# Patient Record
Sex: FEMALE | Race: WHITE | Employment: OTHER | URBAN - METROPOLITAN AREA
[De-identification: names, ages, dates, MRNs, and addresses within clinical notes are randomized per-mention and may not be internally consistent; named-entity substitution may affect disease eponyms.]

---

## 2019-02-03 ENCOUNTER — APPOINTMENT (OUTPATIENT)
Dept: GENERAL RADIOLOGY | Age: 74
End: 2019-02-03
Payer: MEDICARE

## 2019-02-03 ENCOUNTER — HOSPITAL ENCOUNTER (EMERGENCY)
Age: 74
Discharge: HOME | End: 2019-02-03
Attending: EMERGENCY MEDICINE
Payer: MEDICARE

## 2019-02-03 VITALS
RESPIRATION RATE: 10 BRPM | OXYGEN SATURATION: 94 % | SYSTOLIC BLOOD PRESSURE: 117 MMHG | HEART RATE: 62 BPM | DIASTOLIC BLOOD PRESSURE: 49 MMHG

## 2019-02-03 DIAGNOSIS — R07.9 CHEST PAIN, UNSPECIFIED TYPE: Primary | ICD-10-CM

## 2019-02-03 LAB
ABSOLUTE EOS #: 0.7 K/UL (ref 0–0.4)
ABSOLUTE IMMATURE GRANULOCYTE: ABNORMAL K/UL (ref 0–0.3)
ABSOLUTE LYMPH #: 2.4 K/UL (ref 1–4.8)
ABSOLUTE MONO #: 0.9 K/UL (ref 0.2–0.8)
ANION GAP SERPL CALCULATED.3IONS-SCNC: 16 MMOL/L (ref 9–17)
BASOPHILS # BLD: 0 % (ref 0–2)
BASOPHILS ABSOLUTE: 0 K/UL (ref 0–0.2)
BNP INTERPRETATION: ABNORMAL
BUN BLDV-MCNC: 17 MG/DL (ref 8–23)
BUN/CREAT BLD: 27 (ref 9–20)
CALCIUM SERPL-MCNC: 9.7 MG/DL (ref 8.6–10.4)
CHLORIDE BLD-SCNC: 100 MMOL/L (ref 98–107)
CO2: 24 MMOL/L (ref 20–31)
CREAT SERPL-MCNC: 0.62 MG/DL (ref 0.5–0.9)
DIFFERENTIAL TYPE: ABNORMAL
EKG ATRIAL RATE: 70 BPM
EKG P AXIS: 63 DEGREES
EKG P-R INTERVAL: 154 MS
EKG Q-T INTERVAL: 388 MS
EKG QRS DURATION: 88 MS
EKG QTC CALCULATION (BAZETT): 419 MS
EKG R AXIS: 94 DEGREES
EKG T AXIS: 89 DEGREES
EKG VENTRICULAR RATE: 70 BPM
EOSINOPHILS RELATIVE PERCENT: 7 % (ref 1–4)
GFR AFRICAN AMERICAN: >60 ML/MIN
GFR NON-AFRICAN AMERICAN: >60 ML/MIN
GFR SERPL CREATININE-BSD FRML MDRD: ABNORMAL ML/MIN/{1.73_M2}
GFR SERPL CREATININE-BSD FRML MDRD: ABNORMAL ML/MIN/{1.73_M2}
GLUCOSE BLD-MCNC: 119 MG/DL (ref 70–99)
HCT VFR BLD CALC: 39.4 % (ref 36–46)
HEMOGLOBIN: 13.2 G/DL (ref 12–16)
IMMATURE GRANULOCYTES: ABNORMAL %
LYMPHOCYTES # BLD: 23 % (ref 24–44)
MCH RBC QN AUTO: 28.3 PG (ref 26–34)
MCHC RBC AUTO-ENTMCNC: 33.4 G/DL (ref 31–37)
MCV RBC AUTO: 84.8 FL (ref 80–100)
MONOCYTES # BLD: 8 % (ref 1–7)
MYOGLOBIN: 23 NG/ML (ref 25–58)
NRBC AUTOMATED: ABNORMAL PER 100 WBC
PDW BLD-RTO: 14.1 % (ref 11.5–14.5)
PLATELET # BLD: 267 K/UL (ref 130–400)
PLATELET ESTIMATE: ABNORMAL
PMV BLD AUTO: 10 FL (ref 6–12)
POTASSIUM SERPL-SCNC: 4.4 MMOL/L (ref 3.7–5.3)
PRO-BNP: 347 PG/ML
RBC # BLD: 4.65 M/UL (ref 4–5.2)
RBC # BLD: ABNORMAL 10*6/UL
SEG NEUTROPHILS: 62 % (ref 36–66)
SEGMENTED NEUTROPHILS ABSOLUTE COUNT: 6.6 K/UL (ref 1.8–7.7)
SODIUM BLD-SCNC: 140 MMOL/L (ref 135–144)
TROPONIN INTERP: ABNORMAL
TROPONIN T: ABNORMAL NG/ML
TROPONIN, HIGH SENSITIVITY: 7 NG/L (ref 0–14)
WBC # BLD: 10.6 K/UL (ref 3.5–11)
WBC # BLD: ABNORMAL 10*3/UL

## 2019-02-03 PROCEDURE — 71045 X-RAY EXAM CHEST 1 VIEW: CPT

## 2019-02-03 PROCEDURE — 80048 BASIC METABOLIC PNL TOTAL CA: CPT

## 2019-02-03 PROCEDURE — 83880 ASSAY OF NATRIURETIC PEPTIDE: CPT

## 2019-02-03 PROCEDURE — 85025 COMPLETE CBC W/AUTO DIFF WBC: CPT

## 2019-02-03 PROCEDURE — 83874 ASSAY OF MYOGLOBIN: CPT

## 2019-02-03 PROCEDURE — 84484 ASSAY OF TROPONIN QUANT: CPT

## 2019-02-03 PROCEDURE — 99285 EMERGENCY DEPT VISIT HI MDM: CPT

## 2019-02-03 PROCEDURE — 93005 ELECTROCARDIOGRAM TRACING: CPT

## 2019-02-04 ASSESSMENT — ENCOUNTER SYMPTOMS
WHEEZING: 0
RHINORRHEA: 0
VOMITING: 0
SORE THROAT: 0
SHORTNESS OF BREATH: 0
COUGH: 0
CONSTIPATION: 0
ABDOMINAL PAIN: 0
SINUS PRESSURE: 0
COLOR CHANGE: 0
DIARRHEA: 0
NAUSEA: 0

## 2019-08-15 ENCOUNTER — HOSPITAL ENCOUNTER (OUTPATIENT)
Dept: CARDIAC CATH/INVASIVE PROCEDURES | Age: 74
Discharge: HOME | End: 2019-08-15
Payer: MEDICARE

## 2019-08-15 VITALS
WEIGHT: 195 LBS | OXYGEN SATURATION: 96 % | RESPIRATION RATE: 16 BRPM | HEIGHT: 64 IN | SYSTOLIC BLOOD PRESSURE: 100 MMHG | DIASTOLIC BLOOD PRESSURE: 46 MMHG | BODY MASS INDEX: 33.29 KG/M2 | HEART RATE: 45 BPM | TEMPERATURE: 97.3 F

## 2019-08-15 LAB
GFR NON-AFRICAN AMERICAN: >60 ML/MIN
GFR SERPL CREATININE-BSD FRML MDRD: >60 ML/MIN
GFR SERPL CREATININE-BSD FRML MDRD: NORMAL ML/MIN/{1.73_M2}
GLUCOSE BLD-MCNC: 162 MG/DL (ref 74–100)
PLATELET # BLD: 255 K/UL (ref 138–453)
POC CHLORIDE: 104 MMOL/L (ref 98–107)
POC CREATININE: 0.58 MG/DL (ref 0.51–1.19)
POC HEMATOCRIT: 43 % (ref 36–46)
POC HEMOGLOBIN: 14.5 G/DL (ref 12–16)
POC POTASSIUM: 4.4 MMOL/L (ref 3.5–4.5)
POC SODIUM: 139 MMOL/L (ref 138–146)

## 2019-08-15 PROCEDURE — 82565 ASSAY OF CREATININE: CPT

## 2019-08-15 PROCEDURE — 6360000004 HC RX CONTRAST MEDICATION

## 2019-08-15 PROCEDURE — 7100000010 HC PHASE II RECOVERY - FIRST 15 MIN

## 2019-08-15 PROCEDURE — 6360000002 HC RX W HCPCS

## 2019-08-15 PROCEDURE — C1725 CATH, TRANSLUMIN NON-LASER: HCPCS

## 2019-08-15 PROCEDURE — 2500000003 HC RX 250 WO HCPCS

## 2019-08-15 PROCEDURE — C1894 INTRO/SHEATH, NON-LASER: HCPCS

## 2019-08-15 PROCEDURE — 85049 AUTOMATED PLATELET COUNT: CPT

## 2019-08-15 PROCEDURE — 84295 ASSAY OF SERUM SODIUM: CPT

## 2019-08-15 PROCEDURE — 82947 ASSAY GLUCOSE BLOOD QUANT: CPT

## 2019-08-15 PROCEDURE — 84132 ASSAY OF SERUM POTASSIUM: CPT

## 2019-08-15 PROCEDURE — 85014 HEMATOCRIT: CPT

## 2019-08-15 PROCEDURE — 2709999900 HC NON-CHARGEABLE SUPPLY

## 2019-08-15 PROCEDURE — C1769 GUIDE WIRE: HCPCS

## 2019-08-15 PROCEDURE — 82435 ASSAY OF BLOOD CHLORIDE: CPT

## 2019-08-15 PROCEDURE — 7100000011 HC PHASE II RECOVERY - ADDTL 15 MIN

## 2019-08-15 PROCEDURE — 93458 L HRT ARTERY/VENTRICLE ANGIO: CPT | Performed by: INTERNAL MEDICINE

## 2019-08-15 RX ORDER — SODIUM CHLORIDE 0.9 % (FLUSH) 0.9 %
10 SYRINGE (ML) INJECTION PRN
Status: CANCELLED | OUTPATIENT
Start: 2019-08-15

## 2019-08-15 RX ORDER — SODIUM CHLORIDE 0.9 % (FLUSH) 0.9 %
10 SYRINGE (ML) INJECTION EVERY 12 HOURS SCHEDULED
Status: CANCELLED | OUTPATIENT
Start: 2019-08-15

## 2019-08-15 RX ORDER — SODIUM CHLORIDE 0.9 % (FLUSH) 0.9 %
10 SYRINGE (ML) INJECTION PRN
Status: DISCONTINUED | OUTPATIENT
Start: 2019-08-15 | End: 2019-08-16 | Stop reason: HOSPADM

## 2019-08-15 RX ORDER — SODIUM CHLORIDE 9 MG/ML
INJECTION, SOLUTION INTRAVENOUS CONTINUOUS
Status: DISCONTINUED | OUTPATIENT
Start: 2019-08-15 | End: 2019-08-16 | Stop reason: HOSPADM

## 2019-08-15 RX ORDER — ACETAMINOPHEN 325 MG/1
650 TABLET ORAL EVERY 4 HOURS PRN
Status: CANCELLED | OUTPATIENT
Start: 2019-08-15

## 2019-08-15 RX ORDER — SODIUM CHLORIDE 0.9 % (FLUSH) 0.9 %
10 SYRINGE (ML) INJECTION EVERY 12 HOURS SCHEDULED
Status: DISCONTINUED | OUTPATIENT
Start: 2019-08-15 | End: 2019-08-16 | Stop reason: HOSPADM

## 2019-08-15 RX ADMIN — SODIUM CHLORIDE: 9 INJECTION, SOLUTION INTRAVENOUS at 10:02

## 2019-08-15 NOTE — OP NOTE
[facility]    CARDIAC CATHETERIZATION    Date:   8/15/2019  Patient name: Rod Bell  Date of admission:  8/15/2019  9:32 AM  MRN:   [id number]  YOB: 1945    Operators:  Primary:  Dr Michelle Boone   CV Fellow:  Dr Silviano Page    Pre Procedure Conscious Sedation Data:    ASA Class:    [] I [x] II [] III [] IV    Mallampati Class:  [] I [x] II [] III [] IV     Indication:  [] STEMI      [x] + Stress test  [] ACS      [] + EKG Changes  [] Non Q MI       [x] Significant Risk Factors  [] Recurrent Angina             [] Diabetes Mellitus    [] New LBBB      [] Uncontrolled HTN. [] CHF / Low EF changes     [] Abnormal CTA / Ca Score    Procedure:  Access:  [] Femoral  [x] Radial  artery       [x] Right  [] Left    Procedure: After informed consent was obtained with explanation of the risks and benefits, patient was brought to the cath lab. The access area was prepped and draped in sterile fashion. 1% lidocaine was used for local block. The artery was cannulated with 6  Fr sheath with brisk arterial blood return. The side port was frequently flushed and aspirated with normal saline. Findings:  Left main: Normal 0% stenosis  LAD: patent stent with minimum luminal ir regularities 20-30% stenosis small mid and distal vessel  LCX: minimum luminal ir regularities 10-20% stenosis  RCA: Normal 0% stenosis  The LV gram was performed in the DENNIS 30 position. LVEF: 50%. LV Wall Motion: Anterior apical hypokinesis    Conclusions:  1. Minimal CAD  2. Patent LAD stent  3. Borderline LV function    Recommendation:  1. Medical treatments        History and Risk Factors    [x] Hypertension     [] Family history of CAD  [x] Hyperlipidemia     [] Cerebrovascular Disease   [x] Prior MI       [] Peripheral Vascular disease   [x] Prior PCI              [] Diabetes Mellitus    [] Left Main PCI. [] Currently on Dialysis. [] Prior CABG. [] Currently smoker.     [] Cardiac Arrest outside of healthcare

## 2019-08-15 NOTE — H&P
mg by mouth daily. Historical Provider, MD   clopidogrel (PLAVIX) 75 MG tablet Take 75 mg by mouth daily. Historical Provider, MD   metoprolol (LOPRESSOR) 50 MG tablet Take 50 mg by mouth 2 times daily. Historical Provider, MD   atorvastatin (LIPITOR) 40 MG tablet Take 40 mg by mouth daily. Historical Provider, MD   furosemide (LASIX) 20 MG tablet Take 20 mg by mouth 2 times daily. Historical Provider, MD   docusate sodium (COLACE) 100 MG capsule Take 100 mg by mouth 2 times daily. Historical Provider, MD       Allergies   Allergen Reactions    Cleocin [Clindamycin Hcl] Rash         REVIEW OF SYSTEMS:     A detailed review of system was performed as already noted and is otherwise as above. PHYSICAL EXAM:     There were no vitals filed for this visit. Constitutional and General Appearance: alert, cooperative, no distress and appears stated age  [de-identified]: PERRL, no cervical lymphadenopathy. No masses palpable. Normal oral mucosa  Respiratory:  · Normal excursion and expansion without use of accessory muscles  · Resp Auscultation: Good respiratory effort. No for increased work of breathing. On auscultation: clear to auscultation bilaterally  Cardiovascular:  · The apical impulse is not displaced  · Heart tones are crisp and normal. regular S1 and S2. Abdomen:  · No masses or tenderness  · Bowel sounds present  Extremities:  ·  No Cyanosis or Clubbing  ·  Lower extremity edema: Yes  · Skin: Warm and dry  Neurological:  · Alert and oriented. · Moves all extremities well  · No abnormalities of mood, affect, memory, mentation, or behavior are noted      Assessment:  1. Stable angina with recurrent anginal symptoms with stress test showing anterior apical infarction with ischemia  2. HTN  3. Dyslipidemia        Plan:  1. Proceed with planned LHC after discussing with Dr COTO  2. Further orders to follow.        Joselyn Vargas MD  Fellow, Cardiovascular Diseases    [address] [facility]        Attending Physician Statement  I have discussed the case of Malik Anderson including pertinent history and exam findings with the resident. I have seen and examined the patient and the key elements of the encounter have been performed by me. I agree with the assessment, plan and orders as documented by the resident With changes made to the note.       Electronically signed by Carmen Mae MD on 8/15/2019 at 11:37 AM.    [facility]      [phone]

## 2019-08-15 NOTE — PROGRESS NOTES
1-3 ml. Of air released from TR band . every 15 minutes until all air is released.  No hematoma or drainage noted

## 2021-04-15 ENCOUNTER — HOSPITAL ENCOUNTER (OUTPATIENT)
Dept: CARDIAC CATH/INVASIVE PROCEDURES | Age: 76
Discharge: HOME | End: 2021-04-15
Payer: MEDICARE

## 2021-04-15 PROCEDURE — 93005 ELECTROCARDIOGRAM TRACING: CPT | Performed by: INTERNAL MEDICINE

## 2021-04-15 RX ORDER — SODIUM CHLORIDE 9 MG/ML
INJECTION, SOLUTION INTRAVENOUS CONTINUOUS
Status: DISCONTINUED | OUTPATIENT
Start: 2021-04-15 | End: 2021-04-16 | Stop reason: HOSPADM

## 2021-04-15 NOTE — H&P
[facility]   History & Physical               Today's Date: 4/15/2021  Patient Name: Amna Pierre  Date of admission: No admission date for patient encounter. Patient's age: 67 y.o., 1945  Admission Dx: No admission diagnoses are documented for this encounter. Reason for Admission:  Elective Cardioversion    CHIEF COMPLAINT:  Atrial Fibrillation  No chief complaint on file. History Obtained From:  patient, electronic medical record    HISTORY OF PRESENT ILLNESS:      The patient is a 67 y. o.female who is admitted to the hospital for an elective Cardioversion. PMH of 1. new onset atrial fibrillation, CAD with prior STEMI and stenting to the LAD, Ischemic cardiomyopathy with borderline LV systolic function, mildly reduced, HTN, HLD, obesity was seen in Dr Dion Pastor clinic in the beginning of [address] [facility] At that time, she was found to be in Atrial fibrillation and after extensive discussion decided she would like to be cardioverted once properly anticoagulated. She did not want to undergo a LILIAM for CV as she was asymptomatic. Reports being compliant with eliquis 5 mg BID for close to 2 months and presents today for an elective CV. Past Medical History:   has a past medical history of CHF (congestive heart failure) ([facility] [address] Diabetes mellitus ([address] Hyperlipidemia, Hypertension, and MI (myocardial infarction) ([facility] [address] Past Surgical History:   has a past surgical history that includes Coronary angioplasty with stent; Cholecystectomy; and Cardiac surgery. Home Medications:    Prior to Admission medications    Medication Sig Start Date End Date Taking? Authorizing Provider   metFORMIN (GLUCOPHAGE) 500 MG tablet Take 500 mg by mouth every morning    Historical Provider, MD   metFORMIN (GLUCOPHAGE) 1000 MG tablet Take 1,000 mg by mouth nightly    Historical Provider, MD   aspirin 325 MG tablet Take 325 mg by mouth daily.     Historical Provider, MD   lisinopril (PRINIVIL;ZESTRIL) 2.5 MG extremity edema: No  Skin:  · Warm and dry  Neurological:  · Alert and oriented. · Moves all extremities well        DATA:    Diagnostics:      ECHO:  2/19  EF 45% with grade I DD    Labs:     FASTING LIPID PANEL:  Lab Results   Component Value Date    HDL 58 03/19/2013    LDLDIRECT 47 03/19/2013    TRIG 80 03/19/2013         Patient's Active Problem List  Active Problems:    * No active hospital problems. *  Resolved Problems:    * No resolved hospital problems. *        IMPRESSION:    1. New onset atrial fibrillation   2. CAD with prior STEMI and stenting to the LAD,  3. Ischemic cardiomyopathy with borderline LV systolic function, mildly reduced (EF 45% with grade I DD)  4. Hypertension  5. Hyperlipidemia  6. Obesity    RECOMMENDATIONS:  1. Proceed with planned procedure  2. Further recommendations to follow after procedure. Discussed with patient and Nurse. Jose Raul Sol M.D. Fellow, [address] [place] [address]      Please note that part of this chart were generated using voice recognition  dictation software. Although every effort was made to ensure the accuracy of this automated transcription, some errors in transcription may have occurred. Attending Physician Statement  I have discussed the case of Amna Pierre including pertinent history and exam findings with the resident. I have seen and examined the patient and the key elements of the encounter have been performed by me. I agree with the assessment, plan and orders as documented by the resident With changes made to the note.      Electronically signed by Leigh Ratliff MD on 4/21/2021 at 3:11 PM.    [facility]      [phone]

## 2021-04-15 NOTE — PROGRESS NOTES
Patient arrives for scheduled cardioversion with EKG completed and shows sinus rex. Dr Wali Jones in and chart reviewed. Dr Wali Jones in to speak with patient and daughter. Procedure cancelled. No new medication. Discharged to home / ambulates with daughter and all belongings, questions answered.

## 2021-04-16 LAB
EKG ATRIAL RATE: 46 BPM
EKG P AXIS: 62 DEGREES
EKG P-R INTERVAL: 156 MS
EKG Q-T INTERVAL: 444 MS
EKG QRS DURATION: 84 MS
EKG QTC CALCULATION (BAZETT): 388 MS
EKG R AXIS: 72 DEGREES
EKG T AXIS: 116 DEGREES
EKG VENTRICULAR RATE: 46 BPM

## 2023-07-26 ENCOUNTER — APPOINTMENT (OUTPATIENT)
Dept: CT IMAGING | Age: 78
End: 2023-07-26
Payer: MEDICARE

## 2023-07-26 ENCOUNTER — HOSPITAL ENCOUNTER (EMERGENCY)
Age: 78
Discharge: HOME | End: 2023-07-26
Attending: EMERGENCY MEDICINE
Payer: MEDICARE

## 2023-07-26 VITALS
HEIGHT: 64 IN | BODY MASS INDEX: 34.15 KG/M2 | WEIGHT: 200 LBS | HEART RATE: 81 BPM | SYSTOLIC BLOOD PRESSURE: 134 MMHG | DIASTOLIC BLOOD PRESSURE: 71 MMHG | TEMPERATURE: 97 F | OXYGEN SATURATION: 96 % | RESPIRATION RATE: 16 BRPM

## 2023-07-26 DIAGNOSIS — S09.90XA INJURY OF HEAD, INITIAL ENCOUNTER: Primary | ICD-10-CM

## 2023-07-26 DIAGNOSIS — S00.03XA HEMATOMA OF SCALP, INITIAL ENCOUNTER: ICD-10-CM

## 2023-07-26 DIAGNOSIS — Z79.01 ANTICOAGULATED: ICD-10-CM

## 2023-07-26 PROCEDURE — 70450 CT HEAD/BRAIN W/O DYE: CPT

## 2023-07-26 PROCEDURE — 72125 CT NECK SPINE W/O DYE: CPT

## 2023-07-26 PROCEDURE — 99284 EMERGENCY DEPT VISIT MOD MDM: CPT

## 2023-07-26 RX ORDER — CLOPIDOGREL BISULFATE 75 MG/1
TABLET ORAL
COMMUNITY

## 2023-07-26 ASSESSMENT — ENCOUNTER SYMPTOMS
SHORTNESS OF BREATH: 0
NAUSEA: 0
ABDOMINAL PAIN: 0
COUGH: 0
DIARRHEA: 0
BACK PAIN: 0
SORE THROAT: 0
VOMITING: 0

## 2023-07-26 ASSESSMENT — PAIN - FUNCTIONAL ASSESSMENT: PAIN_FUNCTIONAL_ASSESSMENT: 0-10

## 2023-07-26 ASSESSMENT — PAIN SCALES - GENERAL: PAINLEVEL_OUTOF10: 1

## 2023-07-26 NOTE — DISCHARGE INSTRUCTIONS
Continue with ice pack therapy. Tylenol over-the-counter as directed to help with pain.     Return to ER or call 911; atypical headaches, weakness or confusion, nausea or vomiting, or any other concerning symptoms
yes

## 2024-02-05 ENCOUNTER — HOSPITAL ENCOUNTER (OUTPATIENT)
Age: 79
Setting detail: OUTPATIENT SURGERY
Discharge: HOME | End: 2024-02-05
Attending: INTERNAL MEDICINE | Admitting: INTERNAL MEDICINE
Payer: MEDICARE

## 2024-02-05 VITALS
HEIGHT: 64 IN | BODY MASS INDEX: 36.37 KG/M2 | RESPIRATION RATE: 23 BRPM | WEIGHT: 213 LBS | HEART RATE: 68 BPM | TEMPERATURE: 97.8 F | SYSTOLIC BLOOD PRESSURE: 99 MMHG | OXYGEN SATURATION: 97 % | DIASTOLIC BLOOD PRESSURE: 50 MMHG

## 2024-02-05 DIAGNOSIS — R94.39 ABNORMAL STRESS TEST: ICD-10-CM

## 2024-02-05 LAB
BUN BLD-MCNC: 22 MG/DL (ref 8–26)
CHLORIDE BLD-SCNC: 106 MMOL/L (ref 98–107)
ECHO BSA: 2.09 M2
EGFR, POC: >60 ML/MIN/1.73M2
GLUCOSE BLD-MCNC: 144 MG/DL (ref 74–100)
HCT VFR BLD AUTO: 43 % (ref 36–46)
PLATELET # BLD AUTO: 234 K/UL (ref 138–453)
POC CREATININE: 0.8 MG/DL (ref 0.51–1.19)
POC HEMOGLOBIN (CALC): 14.5 G/DL (ref 12–16)
POTASSIUM BLD-SCNC: 4.2 MMOL/L (ref 3.5–4.5)
SODIUM BLD-SCNC: 140 MMOL/L (ref 138–146)

## 2024-02-05 PROCEDURE — C1769 GUIDE WIRE: HCPCS | Performed by: INTERNAL MEDICINE

## 2024-02-05 PROCEDURE — 82565 ASSAY OF CREATININE: CPT

## 2024-02-05 PROCEDURE — 84295 ASSAY OF SERUM SODIUM: CPT

## 2024-02-05 PROCEDURE — 84132 ASSAY OF SERUM POTASSIUM: CPT

## 2024-02-05 PROCEDURE — 93458 L HRT ARTERY/VENTRICLE ANGIO: CPT | Performed by: INTERNAL MEDICINE

## 2024-02-05 PROCEDURE — 82435 ASSAY OF BLOOD CHLORIDE: CPT

## 2024-02-05 PROCEDURE — 84520 ASSAY OF UREA NITROGEN: CPT

## 2024-02-05 PROCEDURE — 2500000003 HC RX 250 WO HCPCS: Performed by: INTERNAL MEDICINE

## 2024-02-05 PROCEDURE — 85014 HEMATOCRIT: CPT

## 2024-02-05 PROCEDURE — 82947 ASSAY GLUCOSE BLOOD QUANT: CPT

## 2024-02-05 PROCEDURE — 7100000011 HC PHASE II RECOVERY - ADDTL 15 MIN: Performed by: INTERNAL MEDICINE

## 2024-02-05 PROCEDURE — 6360000002 HC RX W HCPCS: Performed by: INTERNAL MEDICINE

## 2024-02-05 PROCEDURE — 85049 AUTOMATED PLATELET COUNT: CPT

## 2024-02-05 PROCEDURE — C1894 INTRO/SHEATH, NON-LASER: HCPCS | Performed by: INTERNAL MEDICINE

## 2024-02-05 PROCEDURE — 6360000004 HC RX CONTRAST MEDICATION: Performed by: INTERNAL MEDICINE

## 2024-02-05 PROCEDURE — 2709999900 HC NON-CHARGEABLE SUPPLY: Performed by: INTERNAL MEDICINE

## 2024-02-05 PROCEDURE — 7100000010 HC PHASE II RECOVERY - FIRST 15 MIN: Performed by: INTERNAL MEDICINE

## 2024-02-05 PROCEDURE — 2580000003 HC RX 258: Performed by: INTERNAL MEDICINE

## 2024-02-05 RX ORDER — SODIUM CHLORIDE 9 MG/ML
INJECTION, SOLUTION INTRAVENOUS CONTINUOUS
Status: DISCONTINUED | OUTPATIENT
Start: 2024-02-05 | End: 2024-02-05 | Stop reason: HOSPADM

## 2024-02-05 RX ORDER — MIDAZOLAM HYDROCHLORIDE 1 MG/ML
INJECTION INTRAMUSCULAR; INTRAVENOUS PRN
Status: DISCONTINUED | OUTPATIENT
Start: 2024-02-05 | End: 2024-02-05 | Stop reason: HOSPADM

## 2024-02-05 RX ORDER — ACETAMINOPHEN 325 MG/1
650 TABLET ORAL EVERY 4 HOURS PRN
Status: DISCONTINUED | OUTPATIENT
Start: 2024-02-05 | End: 2024-02-05 | Stop reason: HOSPADM

## 2024-02-05 RX ORDER — SODIUM CHLORIDE 9 MG/ML
INJECTION, SOLUTION INTRAVENOUS PRN
Status: DISCONTINUED | OUTPATIENT
Start: 2024-02-05 | End: 2024-02-05 | Stop reason: HOSPADM

## 2024-02-05 RX ORDER — FENTANYL CITRATE 50 UG/ML
INJECTION, SOLUTION INTRAMUSCULAR; INTRAVENOUS PRN
Status: DISCONTINUED | OUTPATIENT
Start: 2024-02-05 | End: 2024-02-05 | Stop reason: HOSPADM

## 2024-02-05 RX ORDER — SODIUM CHLORIDE 0.9 % (FLUSH) 0.9 %
5-40 SYRINGE (ML) INJECTION EVERY 12 HOURS SCHEDULED
Status: DISCONTINUED | OUTPATIENT
Start: 2024-02-05 | End: 2024-02-05 | Stop reason: HOSPADM

## 2024-02-05 RX ORDER — SODIUM CHLORIDE 0.9 % (FLUSH) 0.9 %
5-40 SYRINGE (ML) INJECTION PRN
Status: DISCONTINUED | OUTPATIENT
Start: 2024-02-05 | End: 2024-02-05 | Stop reason: HOSPADM

## 2024-02-05 RX ADMIN — SODIUM CHLORIDE: 9 INJECTION, SOLUTION INTRAVENOUS at 09:20

## 2024-02-05 NOTE — PROGRESS NOTES
Patient received post cath to PCC room 8. Assessment obtained.  Post cath pathway initiated. Right radial site with TR band inflated with 13 mL of air intact. No hematoma noted. Patient without complaints.

## 2024-02-05 NOTE — H&P
[facility]  Pre- Procedure History and Physical/Update          Patient Name:  Jes Mims  MRN:    [id number]  YOB: 1945  Date of evaluation:  2/5/2024    Procedure:                           Cardiac Cath +/- PCI      Indication for procedure:     Abnormal stress test     78-year-old female with previous history of CAD and stenting to LAD, persistent atrial fibrillation who recently underwent a Lexiscan stress test.  Stress test is positive for reversible ischemia in the anterior and apical segments.  Patient has been scheduled for left heart cath and she presented today for the procedure.  She will be admitted.    Past Medical History:   Diagnosis Date    CHF (congestive heart failure) ([facility]     Diabetes mellitus (HCC)     Hyperlipidemia     Hypertension     MI (myocardial infarction) ([facility]) 03/2013       Past Surgical History:   Procedure Laterality Date    CARDIAC SURGERY      CHOLECYSTECTOMY      CORONARY ANGIOPLASTY WITH STENT PLACEMENT      x2        reports that she has never smoked. She has never used smokeless tobacco. She reports that she does not drink alcohol and does not use drugs.    Prior to Admission medications    Medication Sig Start Date End Date Taking? Authorizing Provider   clopidogrel (PLAVIX) 75 MG tablet TAKE ONE TABLET BY MOUTH DAILY 12/22/23   Audelia Mims MD   apixaban (ELIQUIS) 5 MG TABS tablet Take 1 tablet by mouth 2 times daily 12/11/23   Audelia Mims MD   furosemide (LASIX) 40 MG tablet Pt to take 40 mg qam and 20 mg qpm 12/1/23   Audelia Mims MD   lisinopril (PRINIVIL;ZESTRIL) 2.5 MG tablet TAKE ONE TABLET BY MOUTH DAILY 8/25/23   Audelia Mims MD   clopidogrel (PLAVIX) 75 MG tablet clopidogrel 75 mg tablet   Take 1 tablet every day by oral route.  Patient not taking: Reported on 9/26/2023    Rosa Durand MD   metFORMIN (GLUCOPHAGE) 500 MG tablet Take 1 tablet by mouth 2 times daily (with meals)    Rosa Durand

## 2024-02-05 NOTE — PROCEDURES
[facility]        Date:   2/5/2024  Patient name: Jes Mims  Date of admission:  2/5/2024  8:44 AM  MRN:   [id number]  YOB: 1945    CARDIAC CATHETERIZATION    Operators:  Primary: Audelia Mims MD.  Assistant:     Indications for cath: [place], Abnormal nuclear stress test.    Procedure performed: Cardiac cath.    Access: Right Radial artery      Procedure: After informed consent was obtained with explanation of the risks and benefits, patient was brought to the cath lab. The right wrist was prepped and draped in sterile fashion. 1% lidocaine was used for local block. The Radial artery was cannulated with 6  Fr sheath with brisk arterial blood return. The side port was frequently flushed and aspirated with normal saline.    EBL is 5 mL    Dominance is right    Findings:    Left main: Normal with 0% stenosis.    LAD: Patent prior stents with diffuse luminal irregularities of 20 to 30%    LCX: Diffuse luminal irregularities of 20 to 30%    RCA: Luminal irregularities of 10 to 20%    The LV gram was performed in the DENNIS 30 position.   LVEF: 35%. LV Wall Motion: Anteroapical akinesis    Conclusions:  Patent prior LAD stent  Otherwise minimal nonobstructive CAD  Moderately reduced LV systolic function    Recommendation:  Medical treatments.  Risk factors modifications.        Electronically signed by Audelia Mims MD on 2/5/2024 at 12:34 PM      [facility]  [phone]

## 2024-02-05 NOTE — PROGRESS NOTES
Right groin remains without hematoma or drainage. HOB elevated per writer.    Patient eating meal without difficulty.

## 2024-02-05 NOTE — PROGRESS NOTES
Patient admitted, consent signed and questions answered. Patient ready for procedure. Call light to reach with side rails up 2 of 2. Bilateral groin areas clipped with writer and Nancy PORTER present.  Daughter Stefani at bedside with patient.  History and physical needs updated.

## 2024-10-22 ENCOUNTER — HOSPITAL ENCOUNTER (OUTPATIENT)
Dept: MRI IMAGING | Age: 79
Discharge: HOME | End: 2024-10-24
Payer: MEDICARE

## 2024-10-22 DIAGNOSIS — M79.602 PAIN OF LEFT UPPER EXTREMITY: ICD-10-CM

## 2024-10-22 DIAGNOSIS — G89.29 CHRONIC LEFT SHOULDER PAIN: ICD-10-CM

## 2024-10-22 DIAGNOSIS — M25.512 CHRONIC LEFT SHOULDER PAIN: ICD-10-CM

## 2024-10-22 PROCEDURE — 73221 MRI JOINT UPR EXTREM W/O DYE: CPT

## 2024-11-19 ENCOUNTER — HOSPITAL ENCOUNTER (OUTPATIENT)
Age: 79
Setting detail: THERAPIES SERIES
Discharge: HOME | End: 2024-11-19
Payer: MEDICARE

## 2024-11-19 PROCEDURE — 97140 MANUAL THERAPY 1/> REGIONS: CPT

## 2024-11-19 PROCEDURE — 97161 PT EVAL LOW COMPLEX 20 MIN: CPT

## 2024-11-19 NOTE — CONSULTS
None 30  15  0 0   IV/Heparin Lock [] Yes  [x] No 20  0 0   Gait/Transferring [] Impaired  [] Weak  [x] Normal 20  10  0 0   Mental Status [] Forgets limitations  [x] Oriented to own ability 15  0 0      Total:   40     Based on the Assessment score: check the appropriate box.    []  No intervention needed   Low =   Score of 0-24    [x]  Use standard prevention interventions Moderate =  Score of 24-44   [x] Give patient handout and discuss fall prevention strategies   [] Establish goal of education for patient/family RE: fall prevention strategies    []  Use high risk prevention interventions High = Score of 45 and higher   [] Give patient handout and discuss fall prevention strategies   [] Establish goal of education for patient/family Re: fall prevention strategies   [] Discuss lifeline / other resources      Assessment: This pt demonstrates sx of L shldr adhesive capsulitis, rotator cuff weakness and GH joint OA. She will benefit from skilled PT to increase L shldr ROM, strength and function in ADL. Treatment will include education in home ex rx and fall prevention.    Problem list  L shldr pain  AROM deficits L shldr  L shldr weakness      STG: (to be met in 12 treatments)    Pt will demonstrate L shldr AROM equal to R shldr to facilitate function in ADL.  Pt will report 50% decrease in L shldr pain intensity and frequency when trying to sleep  at night and during ADL  Pt will demonstrate L shldr strength that is equal to R shldr     LTG: (to be met in 20 treatment spending additional referral from provider )  Max L shldr pain of 2/10 during normal ADL  Pt will attain a UEFI score of 65/80 or higher to indicate improving function in ADL.        Patient goals: \"to feel better\"    Rehab Potential:  [x] Good  [] Fair  [] Poor   Suggested Professional Referral:  [x] No  [] Yes:  Barriers to Goal Achievement::  [x] No  [] Yes:  Domestic Concerns:  [x] No  [] Yes:    Treatment Plan:  [x] Therapeutic Exercise   [id number]

## 2024-11-22 ENCOUNTER — HOSPITAL ENCOUNTER (OUTPATIENT)
Age: 79
Setting detail: THERAPIES SERIES
Discharge: HOME | End: 2024-11-22
Payer: MEDICARE

## 2024-11-22 PROCEDURE — 97110 THERAPEUTIC EXERCISES: CPT

## 2024-11-22 PROCEDURE — 97140 MANUAL THERAPY 1/> REGIONS: CPT

## 2024-11-22 NOTE — FLOWSHEET NOTE
[x] [facility]  Outpatient Rehabilitation &  Therapy  [address] [facility]  P: [phone]  F: [phone]              Physical Therapy Daily Treatment Note    Date:  2024  Patient Name:  Jes Mims    :  1945  MRN: [id number]  Physician:  AUNG Henson     Insurance: [employer] Medicare Advantage O   Medical Diagnosis: M79.602 - Pain LUE, M25.512 - Pain L shldr     Rehab Codes: M25.612, S46.012, M75.102    Onset Date: 7/10/2024    Next  Appt: unknown   Visit# / total visits: ; Progress note for Medicare patient due at visit 10     Cancels/No Shows: 0/0    Subjective:   No real L shldr pain noted at time of appointment today, but night time pain remains severe  Pain:  [x] Yes  [] No Location: L GH joint  Pain Rating: (0-10 scale) 2/10  Pain altered Tx:  [x] No  [] Yes  Action:  Comments: Today pt was instructed in R side lying with 2 pillows to support her LUE when trying to sleep    Objective: Capsular end feel to L shldr PROM/stretch  Modalities: Pt declines ice  Precautions:multiple rotator cuff tears  Exercises:  Exercise Reps/ Time Weight/ Level Comments NP=Not  Performed   UBE - Fwd/Rev/Fwd 3 mins      Shldr extension with t-tube 2 x 15  Green     Mid rows with t-tube 2 x 15  Green     ROM pulleys - scaption 3 mins  5 second stretches    LUE Rail shines 15 reps      Shldr horizontal abduction  2 x 10  Green T-band            Seated AAROM L shldr  10 mins  Scaption ER, Abduction     Manual stretches for L GH joint in cardinal planes of motion 8 mins      Cable tower ropes for elbow/shldr extension 2 x 10  L2                                                                           Other: Instruction in home ex rx for shldr extension and mid rows with green t-tube.  Exercises: for home  Access Code: [email or web address]  URL: [email or web address]  Date: 2024  Prepared by: Joshua Hurley     Exercises  - Seated Shoulder Flexion Slide at Table Top with Forearm in

## 2024-12-02 ENCOUNTER — HOSPITAL ENCOUNTER (OUTPATIENT)
Age: 79
Setting detail: THERAPIES SERIES
Discharge: HOME | End: 2024-12-02
Payer: MEDICARE

## 2024-12-02 PROCEDURE — 97140 MANUAL THERAPY 1/> REGIONS: CPT

## 2024-12-02 PROCEDURE — 97110 THERAPEUTIC EXERCISES: CPT

## 2024-12-02 NOTE — FLOWSHEET NOTE
[x] [facility]  Outpatient Rehabilitation &  Therapy  [address] [facility]  P: [phone]  F: [phone]              Physical Therapy Daily Treatment Note    Date:  2024  Patient Name:  Jes Mims    :  1945  MRN: [id number]  Physician:  AUNG Henson     Insurance: [employer] Medicare Advantage O   Medical Diagnosis: M79.602 - Pain LUE, M25.512 - Pain L shldr     Rehab Codes: M25.612, S46.012, M75.102    Onset Date: 7/10/2024    Next  Appt: unknown   Visit# / total visits: 3/12; Progress note for Medicare patient due at visit 10     Cancels/No Shows: 0/0    Subjective:  L shldr is a little sore today. Sx had been minimal for the past 4 days.R sidelying to try to sleep is not working due to pain in L shldr.  Pain:  [x] Yes  [] No Location: L GH joint  Pain Rating: (0-10 scale) 2/10  Pain altered Tx:  [x] No  [] Yes  Action:  Comments: Today pt was instructed in R side lying with 2 pillows to support her LUE when trying to sleep    Objective: Capsular end feel to L shldr PROM/stretch  Modalities: Pt declines ice  Precautions:multiple rotator cuff tears  Exercises:  Exercise Reps/ Time Weight/ Level Comments NP=Not  Performed   UBE - Fwd/Rev/Fwd 3 mins      Shldr extension with t-tube 2 x 15  Green     Mid rows with t-tube 2 x 15  Green     L shldr ER+Abd with t-tube 15 reps  Red     ROM pulleys - scaption 3 mins  5 second stretches    LUE Rail shines 15 reps      Shldr horizontal abduction  2 x 10  Green T-band     L shldr IR/Add/Ext stretch with dowel edilia 10 x 5 secs each      Shldr extension stretches with dowel edilia 10 x 5 secs each      Seated AAROM L shldr  10 mins  Scaption ER, Abduction     Manual stretches for L GH joint in cardinal planes of motion 8 mins      Cable tower ropes for elbow/shldr extension 2 x 10  L2                                                                           Other: Instruction in home ex rx for shldr extension and mid rows with green

## 2024-12-06 ENCOUNTER — HOSPITAL ENCOUNTER (OUTPATIENT)
Age: 79
Setting detail: THERAPIES SERIES
Discharge: HOME | End: 2024-12-06
Payer: MEDICARE

## 2024-12-06 PROCEDURE — 97110 THERAPEUTIC EXERCISES: CPT

## 2024-12-06 PROCEDURE — 97140 MANUAL THERAPY 1/> REGIONS: CPT

## 2024-12-06 NOTE — FLOWSHEET NOTE
[x] [facility]  Outpatient Rehabilitation &  Therapy  [address] [facility]  P: [phone]  F: [phone]              Physical Therapy Daily Treatment Note    Date:  2024  Patient Name:  Jes Mims    :  1945  MRN: [id number]  Physician:  AUNG Henson     Insurance: [employer] Medicare Advantage O   Medical Diagnosis: M79.602 - Pain LUE, M25.512 - Pain L shldr     Rehab Codes: M25.612, S46.012, M75.102    Onset Date: 7/10/2024    Next  Appt: unknown   Visit# / total visits: ; Progress note for Medicare patient due at visit 10     Cancels/No Shows: 0/0    Subjective:  L shldr is quite painful today and the pt attributes this to the weather change.  Sx had been minimal for the past 4 days. Pt will begin cardiac ex at the [facility] after today and continue PT in the clinic setting.  Pain:  [x] Yes  [] No Location: L GH joint  Pain Rating: (0-10 scale) 2/10  Pain altered Tx:  [x] No  [] Yes  Action:  Comments: Today pt was instructed in R side lying with 2 pillows to support her LUE when trying to sleep    Objective: Capsular end feel to L shldr PROM/stretch  Modalities: Pt declines ice  Precautions:multiple rotator cuff tears  Exercises:  Exercise Reps/ Time Weight/ Level Comments NP=Not  Performed   UBE - Fwd/Rev/Fwd 3 mins      Shldr extension with t-tube 2 x 15  Green     Mid rows with t-tube 2 x 15  Green     L shldr ER+Abd with t-tube 15 reps  Red     ROM pulleys - scaption 3 mins  5 second stretches    LUE Rail shines 15 reps      Shldr horizontal abduction  2 x 10  Green T-band     L shldr IR/Add/Ext stretch with dowel edilia 10 x 5 secs each      Shldr extension stretches with dowel edilia 10 x 5 secs each      Seated AAROM L shldr  10 mins  Scaption ER, Abduction     Manual stretches for L GH joint in cardinal planes of motion 8 mins      Cable tower ropes for elbow/shldr extension 2 x 10  L2                                                                           Other:

## 2024-12-09 ENCOUNTER — APPOINTMENT (OUTPATIENT)
Age: 79
End: 2024-12-09
Payer: MEDICARE

## 2024-12-16 ENCOUNTER — HOSPITAL ENCOUNTER (OUTPATIENT)
Age: 79
Setting detail: THERAPIES SERIES
Discharge: HOME | End: 2024-12-16
Payer: MEDICARE

## 2024-12-16 PROCEDURE — 97140 MANUAL THERAPY 1/> REGIONS: CPT

## 2024-12-16 PROCEDURE — 97110 THERAPEUTIC EXERCISES: CPT

## 2024-12-16 NOTE — FLOWSHEET NOTE
[x] [facility]  Outpatient Rehabilitation &  Therapy  [address] [facility]  P: [phone]  F: [phone]              Physical Therapy Daily Treatment Note    Date:  2024  Patient Name:  Jes Mims    :  1945  MRN: [id number]  Physician:  AUNG Henson     Insurance: [employer] Medicare Advantage O   Medical Diagnosis: M79.602 - Pain LUE, M25.512 - Pain L shldr     Rehab Codes: M25.612, S46.012, M75.102    Onset Date: 7/10/2024    Next  Appt: unknown   Visit# / total visits: ; Progress note for Medicare patient due at visit 10     Cancels/No Shows: 0/0    Subjective:  L shldr is only painful at night when trying to sleep or at end range of ER or horizontal adduction.  Pain:  [x] Yes  [] No Location: L GH joint  Pain Rating: (0-10 scale) 2/10  Pain altered Tx:  [x] No  [] Yes  Action:  Comments: Today pt was instructed in R side lying with 2 pillows to support her LUE when trying to sleep    Objective: Capsular end feel to L shldr PROM/stretch  Modalities: Pt declines ice  Precautions:multiple rotator cuff tears  Exercises:  Exercise Reps/ Time Weight/ Level Comments NP=Not  Performed   UBE - Fwd/Rev/Fwd 3 mins      Shldr extension with t-tube 2 x 15  Green     Mid rows with t-tube 2 x 15  Green  NP   L shldr ER+Abd with t-tube 15 reps  Red     ROM pulleys - scaption 2 mins  5 second stretches    LUE Rail shines 15 reps   NP   Shldr horizontal abduction  2 x 10  Green T-band     L shldr IR/Add/Ext stretch with dowel edilia 10 x 5 secs each      Shldr extension stretches with dowel edilia 10 x 5 secs each   NP   Seated AAROM L shldr  2 x 7 eachmins  Scaption ER, Abduction     Manual stretches for L GH joint in cardinal planes of motion 8 mins      Cable tower ropes for elbow/shldr extension 2 x 10  L2            BATCA mid rows 2 x 10  25#     BATCA lat pulls to chest 2 x 10  15#                                                      Other: Instruction in home ex rx for shldr

## 2024-12-27 ENCOUNTER — APPOINTMENT (OUTPATIENT)
Age: 79
End: 2024-12-27
Payer: MEDICARE

## 2024-12-30 ENCOUNTER — HOSPITAL ENCOUNTER (OUTPATIENT)
Age: 79
Setting detail: THERAPIES SERIES
Discharge: HOME | End: 2024-12-30
Payer: MEDICARE

## 2024-12-30 PROCEDURE — 97110 THERAPEUTIC EXERCISES: CPT

## 2024-12-30 PROCEDURE — 97140 MANUAL THERAPY 1/> REGIONS: CPT

## 2024-12-30 NOTE — FLOWSHEET NOTE
abduction with tubing 2 x 10  green     BATCA mid rows 2 x 10  25#  NP   BATCA lat pulls to chest 2 x 10  15#  NP                                                    Other: Instruction in home ex rx for shldr extension and mid rows with green t-tube.  Exercises: for home  Access Code: [email or web address]  URL: [email or web address]  Date: 11/19/2024  Prepared by: Joshua Hurley     Exercises  - Seated Shoulder Flexion Slide at Table Top with Forearm in Neutral  - 1-2 x daily - 5 x weekly - 1 sets - 10 reps - 5 secs. hold  - Seated Shoulder Abduction Towel Slide at Table Top with Forearm in Neutral  - 1-2 x daily - 5 x weekly - 1 sets - 10 reps - 5 secs hold  - Standing Shoulder External Rotation AAROM with Dowel  - 1-2 x daily - 5 x weekly - 1 sets - 10 reps - 2 secs hold  - Standing Scapular Retraction  - 4 x daily - 7 x weekly - 2 sets - 10 reps - 2 secs hold  - Standing Single Arm Elbow Flexion with Resistance  - 1-2 x daily - 5 x weekly - 2 sets - 10 reps - 2 secs hold  *Additional home ex rx*  Access Code: [email or web address]  URL: [email or web address]  Date: 12/06/2024  Prepared by: Joshua Hurley    Exercises  - Shoulder External Rotation with Anchored Resistance  - 1 x daily - 4 x weekly - 2 sets - 12 reps - 1 second hold  - Standing Shoulder Internal Rotation with Anchored Resistance  - 1 x daily - 4 x weekly - 2 sets - 12 reps - 1 sec. hold  - Shoulder extension with resistance - Neutral  - 1 x daily - 4 x weekly - 2 sets - 12 reps - 1 sec. hold  - Shoulder Abduction - Thumbs Up  - 1 x daily - 4 x weekly - 2 sets - 7 reps - 2 secs hold  - Standing Shoulder Flexion to 90 Degrees  - 1 x daily - 4 x weekly - 2 sets - 7 reps - 2 secs. hold  Treatment Charges: Mins Units   []  Modalities     [x]  Ther Exercise 28 2   [x]  Manual Therapy 10 1   []  Ther Activities     []  Neuro Re-ed     []  Vasocompression     [] Gait     []  Other     Total Billable time 38 3     Reaction to tx: Pt reports no WME zayda post session. She

## 2025-01-06 ENCOUNTER — HOSPITAL ENCOUNTER (OUTPATIENT)
Age: 80
Setting detail: THERAPIES SERIES
Discharge: HOME | End: 2025-01-06
Payer: MEDICARE

## 2025-01-06 PROCEDURE — 97140 MANUAL THERAPY 1/> REGIONS: CPT

## 2025-01-06 PROCEDURE — 97110 THERAPEUTIC EXERCISES: CPT

## 2025-01-06 NOTE — FLOWSHEET NOTE
[email or web address]  Date: 11/19/2024  Prepared by: Joshua Hurley     Exercises  - Seated Shoulder Flexion Slide at Table Top with Forearm in Neutral  - 1-2 x daily - 5 x weekly - 1 sets - 10 reps - 5 secs. hold  - Seated Shoulder Abduction Towel Slide at Table Top with Forearm in Neutral  - 1-2 x daily - 5 x weekly - 1 sets - 10 reps - 5 secs hold  - Standing Shoulder External Rotation AAROM with Dowel  - 1-2 x daily - 5 x weekly - 1 sets - 10 reps - 2 secs hold  - Standing Scapular Retraction  - 4 x daily - 7 x weekly - 2 sets - 10 reps - 2 secs hold  - Standing Single Arm Elbow Flexion with Resistance  - 1-2 x daily - 5 x weekly - 2 sets - 10 reps - 2 secs hold  *Additional home ex rx*  Access Code: [email or web address]  URL: [email or web address]  Date: 12/06/2024  Prepared by: Joshua Hurley    Exercises  - Shoulder External Rotation with Anchored Resistance  - 1 x daily - 4 x weekly - 2 sets - 12 reps - 1 second hold  - Standing Shoulder Internal Rotation with Anchored Resistance  - 1 x daily - 4 x weekly - 2 sets - 12 reps - 1 sec. hold  - Shoulder extension with resistance - Neutral  - 1 x daily - 4 x weekly - 2 sets - 12 reps - 1 sec. hold  - Shoulder Abduction - Thumbs Up  - 1 x daily - 4 x weekly - 2 sets - 7 reps - 2 secs hold  - Standing Shoulder Flexion to 90 Degrees  - 1 x daily - 4 x weekly - 2 sets - 7 reps - 2 secs. hold  Treatment Charges: Mins Units   []  Modalities     [x]  Ther Exercise 30 2   [x]  Manual Therapy 10 1   []  Ther Activities     []  Neuro Re-ed     []  Vasocompression     [] Gait     []  Other     Total Billable time 40 3     Reaction to tx: Pt reports no LUE pain post session. She states she only has pain at night time.  Assessment: [x] Progressing toward goals. Pt states she is going back to cardiac rehab exercises sessions soon.    [] No change.     [] Other:  [x] Patient would continue to benefit from skilled physical therapy services in order to: increase L shldr ROM,

## 2025-01-13 ENCOUNTER — HOSPITAL ENCOUNTER (OUTPATIENT)
Age: 80
Setting detail: THERAPIES SERIES
Discharge: HOME | End: 2025-01-13
Payer: MEDICARE

## 2025-01-13 PROCEDURE — 97140 MANUAL THERAPY 1/> REGIONS: CPT

## 2025-01-13 PROCEDURE — 97110 THERAPEUTIC EXERCISES: CPT

## 2025-01-13 NOTE — DISCHARGE SUMMARY
elbow/shldr extension 2 x 10  L2                                                                           Other: Instruction in home ex rx for shldr extension and mid rows with green t-tube.  Exercises: for home  Access Code: [email or web address]  URL: [email or web address]  Date: 12/06/2024  Prepared by: Joshua Hurley    Exercises  - Shoulder External Rotation with Anchored Resistance  - 1 x daily - 4 x weekly - 2 sets - 12 reps - 1 second hold  - Standing Shoulder Internal Rotation with Anchored Resistance  - 1 x daily - 4 x weekly - 2 sets - 12 reps - 1 sec. hold  - Shoulder extension with resistance - Neutral  - 1 x daily - 4 x weekly - 2 sets - 12 reps - 1 sec. hold  - Shoulder Abduction - Thumbs Up  - 1 x daily - 4 x weekly - 2 sets - 7 reps - 2 secs hold  - Standing Shoulder Flexion to 90 Degrees  - 1 x daily - 4 x weekly - 2 sets - 7 reps - 2 secs. hold  Treatment Charges: Mins Units   []  Modalities     [x]  Ther Exercise 30 2   [x]  Manual Therapy 8 1   []  Ther Activities     []  Neuro Re-ed     []  Vasocompression     [] Gait     []  Other     Total Billable time 38 3     Reaction to tx: Pt reports no L shldr pain at rest, but proximal L humeral pain noted at ends of available range. Pt reports no LUE pain post session. She states she only has resting pain at night time.  Assessment: [x] Progressing toward goals. Pt states she is going back to cardiac rehab exercises sessions soon. Tx goals have been partially met.    [] No change.     [] Other:  [x] Patient would continue to benefit from skilled physical therapy services in order to: increase L shldr ROM, strength and function in ADL. Treatment will include education in home ex rx and fall prevention.     Problem list  L shldr pain  AROM deficits L shldr  L shldr weakness    STG (to be met in 12 treatments)     Pt will demonstrate L shldr AROM equal to R shldr to facilitate function in ADL. - Goal met  Pt will report 50% decrease in L shldr pain intensity

## 2025-01-27 ENCOUNTER — APPOINTMENT (OUTPATIENT)
Age: 80
End: 2025-01-27
Payer: MEDICARE

## (undated) DEVICE — ANGIOGRAPHIC CATHETER: Brand: EXPO™

## (undated) DEVICE — SURGICAL PROCEDURE TRAY CRD CATH SVMMC

## (undated) DEVICE — GUIDEWIRE 35/260/FC/PTFE/3J: Brand: GUIDEWIRE

## (undated) DEVICE — BAND COMPR L24CM REG CLR PLAS HEMSTAT EXT HK AND LOOP RETEN

## (undated) DEVICE — GLIDESHEATH SLENDER STAINLESS STEEL KIT: Brand: GLIDESHEATH SLENDER